# Patient Record
Sex: MALE
[De-identification: names, ages, dates, MRNs, and addresses within clinical notes are randomized per-mention and may not be internally consistent; named-entity substitution may affect disease eponyms.]

---

## 2017-03-19 ENCOUNTER — HOSPITAL ENCOUNTER (EMERGENCY)
Dept: HOSPITAL 25 - UCEAST | Age: 57
Discharge: HOME | End: 2017-03-19
Payer: COMMERCIAL

## 2017-03-19 VITALS — DIASTOLIC BLOOD PRESSURE: 85 MMHG | SYSTOLIC BLOOD PRESSURE: 154 MMHG

## 2017-03-19 DIAGNOSIS — R10.12: Primary | ICD-10-CM

## 2017-03-19 DIAGNOSIS — E11.9: ICD-10-CM

## 2017-03-19 PROCEDURE — G0463 HOSPITAL OUTPT CLINIC VISIT: HCPCS

## 2017-03-19 PROCEDURE — 93005 ELECTROCARDIOGRAM TRACING: CPT

## 2017-03-19 PROCEDURE — 85025 COMPLETE CBC W/AUTO DIFF WBC: CPT

## 2017-03-19 PROCEDURE — 36415 COLL VENOUS BLD VENIPUNCTURE: CPT

## 2017-03-19 PROCEDURE — 81003 URINALYSIS AUTO W/O SCOPE: CPT

## 2017-03-19 PROCEDURE — 99212 OFFICE O/P EST SF 10 MIN: CPT

## 2017-03-19 NOTE — UC
Abdominal Pain Male HPI





- HPI Summary


HPI Summary: 





57 yo male had the onset of left sided abd pain yesterday


felt gassy


could not get comfortable


tossed and turned last night


today pain is less


no n/v/d


some mild chest tightness


some mild URI symptoms





no hx diverticulitis





DM x 10 yrs











- History of Current Complaint


Chief Complaint: UCRespiratory


Stated Complaint: CHEST HEAVY SOB


Time Seen by Provider: 03/19/17 12:13


Hx Obtained From: Patient


Onset/Duration: Gradual Onset, Lasting Days


Timing: Constant


Severity Initially: Severe


Severity Currently: Mild


Pain Intensity: 4


Pain Scale Used: 0-10 Numeric


Location: Other - LUQ and LLQ


Radiates: Yes


Character: Cramping


Aggravating Factor(s):: Nothing


Alleviating Factor(s): Nothing


Associated Signs And Symptoms: Positive: Fever - jasmyn yesterdaY, Decreased 

Appetite.  Negative: Back Pain, Constipation, Blood in Stool, Urinary Symptoms, 

Vomiting, Diarrhea, Penile Discharge





- Allergies/Home Medications


Allergies/Adverse Reactions: 


 Allergies











Allergy/AdvReac Type Severity Reaction Status Date / Time


 


Amoxicillin Allergy Mild Nausea Verified 07/18/16 08:28











Home Medications: 


 Home Medications





Cyclobenzaprine TAB* [Flexeril 10 MG TAB*]  03/19/17 [History]


Glipizide [Glucotrol]  03/19/17 [History]


Liraglutide [Victoza]  03/19/17 [History]


Naproxen TAB* [Naprosyn TAB*]  03/19/17 [History]


metFORMIN* [Glucophage 1000 MG TAB *]  03/19/17 [History]











PMH/Surg Hx/FS Hx/Imm Hx


Endocrine History Of: Reports: Diabetes


   Denies: Thyroid Disease


Cardiovascular History Of: 


   Denies: Cardiac Disorders, Hypertension, Pacemaker/ICD


Respiratory History Of: 


   Denies: COPD, Asthma


GI/ History Of: 


   Denies: Ulcer, Renal Disease





- Surgical History


Surgical History: Yes


Surgery Procedure, Year, and Place: 2001 LEFT Knee surgery.  Tonsilectomy.  

Inguinal hernia





- Family History


Known Family History: Positive: Cardiac Disease - FATHER (NOT AT AN EARLY AGE)





- Social History


Alcohol Use: Occasionally


Substance Use Type: None


Smoking Status (MU): Former Smoker


Length of Time of Smoking/Using Tobacco: SMOKED 6 MONTHS IN 2006


Household Exposure Type: Cigarettes





Review of Systems


Constitutional: Negative


Skin: Negative


Eyes: Negative


ENT: Negative


Respiratory: Negative


Cardiovascular: Chest Pain - MILD PRESSURE


Gastrointestinal: Abdominal Pain


Genitourinary: Negative


Motor: Negative


Neurovascular: Negative


Musculoskeletal: Negative


Neurological: Negative


Psychological: Negative


All Other Systems Reviewed And Are Negative: Yes





Physical Exam


Triage Information Reviewed: Yes


Appearance: Well-Appearing, No Pain Distress, Other: - bmi  42


Vital Signs: 


 Initial Vital Signs











Temp  98.1 F   03/19/17 11:09


 


Pulse  93   03/19/17 11:09


 


Resp  20   03/19/17 11:09


 


BP  154/85   03/19/17 11:09


 


Pulse Ox  97   03/19/17 11:09











Eyes: Positive: Conjunctiva Clear


ENT: Positive: Hearing grossly normal, Other: - NO SINUS TENDERNESS.  Negative: 

Nasal congestion, Nasal drainage, Tonsillar swelling, Tonsillar exudate, Trismus

, Muffled/hoarse voice


Neck exam: Normal


Neck: Positive: Nontender, No Lymphadenopathy


Respiratory: Positive: Lungs clear, Normal breath sounds, No respiratory 

distress, No accessory muscle use


Cardiovascular: Positive: RRR, No Murmur.  Negative: Tachycardia, Bradycardia


Abdomen Description: Positive: Soft.  Negative: Nontender - LUQ AND LLQ TENDER, 

CVA Tenderness (R), CVA Tenderness (L), Distended, Peritoneal Signs, Pulsatile 

Mass


Bowel Sounds: Positive: Present


Musculoskeletal: Positive: ROM Intact, No Edema


Neurological: Positive: Alert


Psychological Exam: Normal


Skin Exam: Normal





Diagnostics





- EKG


Cardiac Rate: NL


Cardiac Rhythm: Sinus: Normal


Ectopy: None


ST Segment: Normal - LAHB





Abd Pain Male Course/Dx





- Differential Dx/Clinical Impression


Provider Diagnoses: LEFT SIDED ABDOMINAL PAIN -? DIVERTICULITIS.  LEFT ANTERIOR 

HEMIBLOCK





Discharge





- Discharge Plan


Condition: Stable


Disposition: HOME


Prescriptions: 


Metronidazole [Flagyl 500 MG TAB] 500 mg PO QID #28 tab


Sulfamethox/Trimethoprim DS* [Bactrim /160 TAB*] 1 tab PO BID #14 tab


Patient Education Materials:  Diverticulitis (ED)


Referrals: 


Richard Mack MD [Primary Care Provider] - 1 Day


Additional Instructions: 


SEE YOUR MD TOMORROW AS PLANNED





WE WILL START ANTIBIOTIC IN CASE YOU HAVE DIVERTICULITIS





IF THINGS WORSEN TODAY OR TONIGHT GO TO THE ER





YOUR EKG SHOWED A LEFT ANTERIOR HEMIBLOCK

## 2017-03-20 LAB
HCT VFR BLD AUTO: 47 % (ref 42–52)
HGB BLD-MCNC: 15.7 G/DL (ref 14–18)
MCH RBC QN AUTO: 28 PG (ref 27–31)
MCHC RBC AUTO-ENTMCNC: 34 G/DL (ref 31–36)
MCV RBC AUTO: 84 FL (ref 80–94)
RBC # BLD AUTO: 5.55 10^6/UL (ref 4–5.4)
WBC # BLD AUTO: 9.7 10^3/UL (ref 3.5–10.8)

## 2018-08-21 ENCOUNTER — HOSPITAL ENCOUNTER (EMERGENCY)
Dept: HOSPITAL 25 - UCEAST | Age: 58
Discharge: HOME | End: 2018-08-21
Payer: COMMERCIAL

## 2018-08-21 VITALS — DIASTOLIC BLOOD PRESSURE: 97 MMHG | SYSTOLIC BLOOD PRESSURE: 151 MMHG

## 2018-08-21 DIAGNOSIS — J32.9: Primary | ICD-10-CM

## 2018-08-21 DIAGNOSIS — Z87.898: ICD-10-CM

## 2018-08-21 DIAGNOSIS — Z88.3: ICD-10-CM

## 2018-08-21 DIAGNOSIS — R05: ICD-10-CM

## 2018-08-21 PROCEDURE — 71046 X-RAY EXAM CHEST 2 VIEWS: CPT

## 2018-08-21 PROCEDURE — G0463 HOSPITAL OUTPT CLINIC VISIT: HCPCS

## 2018-08-21 PROCEDURE — 99212 OFFICE O/P EST SF 10 MIN: CPT

## 2018-08-21 NOTE — UC
Respiratory Complaint HPI





- HPI Summary


HPI Summary: 





58 yo male presents with sinus pain/pressure/congestion, body aches, and 

productive cough for the last 3 days. He tells me that he was at a convention 

in [place] 1 week ago and yesterday found out that many people there were 

diagnosed with influenza A - he is concerned that he may have it and is 

requesting testing today. Has felt feverish, but has not taken his temperature. 

Has not taken anything OTC. Denies sore throat, SOB, chest pain, abdominal pain

, n/v.





- History of Current Complaint


Chief Complaint: UCRespiratory


Stated Complaint: COUGH, AND CHEST CONGESTION


Time Seen by Provider: 08/21/18 17:37


Hx Obtained From: Patient


Onset/Duration: Gradual Onset


Timing: Constant


Severity Initially: Moderate


Severity Currently: Moderate


Pain Intensity: 7


Pain Scale Used: 0-10 Numeric


Character: Cough: Productive





- Allergies/Home Medications


Allergies/Adverse Reactions: 


 Allergies











Allergy/AdvReac Type Severity Reaction Status Date / Time


 


amoxicillin Allergy  Nausea Verified 08/21/18 17:34











Home Medications: 


 Home Medications





Insulin NPH Human Isophane [Humulin N Kwikpen] 100 unit SC 08/21/18 [History]











PMH/Surg Hx/FS Hx/Imm Hx


Endocrine History: Diabetes





- Surgical History


Surgical History: Yes


Surgery Procedure, Year, and Place: 2001 LEFT Knee surgery.  Tonsilectomy.  

Inguinal hernia





- Family History


Known Family History: Positive: Cardiac Disease - FATHER (NOT AT AN EARLY AGE)





- Social History


Occupation: Employed Full-time


Lives: With Family


Alcohol Use: Occasionally


Substance Use Type: None


Smoking Status (MU): Former Smoker


Length of Time of Smoking/Using Tobacco: SMOKED 6 MONTHS IN 2006


Household Exposure Type: Cigarettes





Review of Systems


Constitutional: Fever, Other - Body aches


Skin: Negative


Eyes: Negative


ENT: Nasal Discharge, Sinus Congestion, Sinus Pain/Tenderness


Respiratory: Cough


Cardiovascular: Negative


Gastrointestinal: Negative


Neurovascular: Negative


Neurological: Negative


Psychological: Negative


All Other Systems Reviewed And Are Negative: Yes





Physical Exam





- Summary


Physical Exam Summary: 





GENERAL: NAD. WDWN. No pain distress.


SKIN: No rashes, sores, lesions, or open wounds.


HEENT:


            Head: AT/NC


            Eyes:  EOM intact. Conjunctiva clear without inflammation or 

discharge.


            Ears: Hearing grossly normal. TMs intact, no bulging, erythema, or 

edema. 


            Nose: Nasal mucosa mildly swollen and erythematous with yellow/

clear discharge. TTP maxillary and frontal sinus.


            Throat: Posterior oropharynx without exudates, erythema, or 

tonsillar enlargement.  Uvula midline.


NECK: Supple. Nontender. No lymphadenopathy. 


CHEST:  CTAB. No r/r/w. No accessory muscle use. Breathing comfortably and in 

no distress.


CV:  RRR. Without m/r/g. Pulses intact. 


NEURO: Alert. CN II-XII grossly intact.


PSYCH: Age appropriate behavior.


Triage Information Reviewed: Yes


Vital Signs: 


 Initial Vital Signs











Temp  101.0 F   08/21/18 17:31


 


Pulse  116   08/21/18 17:31


 


Resp  18   08/21/18 17:31


 


BP  151/97   08/21/18 17:31


 


Pulse Ox  98   08/21/18 17:31








 Laboratory Tests











  08/21/18





  17:47


 


Influenza A (Rapid)  Negative


 


Influenza B (Rapid)  Negative











Vital Signs Reviewed: Yes





UC Diagnostic Evaluation





- Laboratory


O2 Sat by Pulse Oximetry: 98





Respiratory Course/Dx





- Course


Course Of Treatment: POC flu negative. CXR has no radiologist reading after 

1800 therefore wet read is read as negative for acute process. Suspect 

sinusitis vs bronchitis. Will rx for zpak and have him f/u if his symptoms 

persist/worsen. Pt agreeable with plan.





- Differential Dx/Diagnosis


Provider Diagnoses: Sinusitis.  Cough





Discharge





- Sign-Out/Discharge


Documenting (check all that apply): Patient Departure


All imaging exams completed and their final reports reviewed: No





- Discharge Plan


Condition: Stable


Disposition: HOME


Prescriptions: 


Azithromycin TAB* [Zithromax TAB (Z-GRISELDA) 250 mg #6 tabs] 2 tab PO .TODAY, THEN 

1 DAILY #1 griselda


Patient Education Materials:  Sinusitis (ED), Acute Bronchitis (ED)


Forms:  *Work Release


Referrals: 


Richard Mack MD [Primary Care Provider] - 


Additional Instructions: 


If you develop a fever, shortness of breath, chest pain, new or worsening 

symptoms - please call your PCP or go to the ED.


 





Your blood pressure was high at todays visit. Please see your primary provider 

within 4 weeks for recheck and re-evaluation.








- Billing Disposition and Condition


Condition: STABLE


Disposition: Home

## 2018-08-22 NOTE — ED
Course/Dx





- Course


Course Of Treatment: POC flu negative. CXR has no radiologist reading after 

1800 therefore wet read is read as negative for acute process. Suspect 

sinusitis vs bronchitis. Will rx for zpak and have him f/u if his symptoms 

persist/worsen. Pt agreeable with plan.





Discharge





- Sign-Out/Discharge


Documenting (check all that apply): Patient Departure


All imaging exams completed and their final reports reviewed: Yes





- Discharge Plan


Condition: Stable


Disposition: HOME


Prescriptions: 


Azithromycin TAB* [Zithromax TAB (Z-GRISELDA) 250 mg #6 tabs] 2 tab PO .TODAY, THEN 

1 DAILY #1 griselda


Patient Education Materials:  Sinusitis (ED), Acute Bronchitis (ED)


Forms:  *Work Release


Referrals: 


Richard Mack MD [Primary Care Provider] - 


Additional Instructions: 


If you develop a fever, shortness of breath, chest pain, new or worsening 

symptoms - please call your PCP or go to the ED.


 





Your blood pressure was high at todays visit. Please see your primary provider 

within 4 weeks for recheck and re-evaluation.








- Billing Disposition and Condition


Condition: STABLE


Disposition: Home

## 2018-08-22 NOTE — RAD
HISTORY: cough



COMPARISONS: None



VIEWS: 4: Frontal dual-energy and lateral views of the chest.



FINDINGS:

CARDIOMEDIASTINAL SILHOUETTE: The cardiomediastinal silhouette is normal.

CARA: The cara are normal.

PLEURA: The costophrenic angles are sharp. No pleural abnormalities are noted.

LUNG PARENCHYMA: The lungs are clear.

ABDOMEN: The upper abdomen is clear. There is no subphrenic gas.

BONES AND SOFT TISSUES: No bone or soft tissue abnormalities are noted.

OTHER: None.



IMPRESSION:

NO ACTIVE CARDIOPULMONARY DISEASE.



R0

## 2019-10-02 ENCOUNTER — HOSPITAL ENCOUNTER (EMERGENCY)
Dept: HOSPITAL 25 - ED | Age: 59
Discharge: HOME | End: 2019-10-02
Payer: COMMERCIAL

## 2019-10-02 VITALS — DIASTOLIC BLOOD PRESSURE: 87 MMHG | SYSTOLIC BLOOD PRESSURE: 151 MMHG

## 2019-10-02 DIAGNOSIS — I73.9: ICD-10-CM

## 2019-10-02 DIAGNOSIS — M85.80: ICD-10-CM

## 2019-10-02 DIAGNOSIS — M19.90: ICD-10-CM

## 2019-10-02 DIAGNOSIS — Z88.1: ICD-10-CM

## 2019-10-02 DIAGNOSIS — Z79.4: ICD-10-CM

## 2019-10-02 DIAGNOSIS — M25.562: Primary | ICD-10-CM

## 2019-10-02 DIAGNOSIS — Z23: ICD-10-CM

## 2019-10-02 DIAGNOSIS — E11.9: ICD-10-CM

## 2019-10-02 DIAGNOSIS — Z87.891: ICD-10-CM

## 2019-10-02 PROCEDURE — 90715 TDAP VACCINE 7 YRS/> IM: CPT

## 2019-10-02 PROCEDURE — 99282 EMERGENCY DEPT VISIT SF MDM: CPT

## 2019-10-02 PROCEDURE — 90471 IMMUNIZATION ADMIN: CPT

## 2019-10-02 RX ADMIN — TETANUS TOXOID, REDUCED DIPHTHERIA TOXOID AND ACELLULAR PERTUSSIS VACCINE, ADSORBED ONE ML: 5; 2.5; 8; 8; 2.5 SUSPENSION INTRAMUSCULAR at 08:11

## 2019-10-02 NOTE — ED
Lower Extremity





- HPI Summary


HPI Summary: 





Pt is a 57 y/o M presenting to the ED for a chief complaint of left leg myalgia 

after a fall on 10/02/19. Pt fell while walking out of the door at his house 

and fell on his left leg. Pt was able to walk after the fall. Pt has injured 

his left leg in the past. The pain is mostly localized to the left knee. Pt 

feels as if the bone is punched out of place. Pt denies fever. Pt also 

fractured his left ankle in 2005. Pt has a PMHx of DM, a FMHx of cancer, and a 

PSHx of a laparoscopic surgery in September 2001 for a torn meniscus. 





- History of Current Complaint


Chief Complaint: EDExtremityLower


Stated Complaint: L KNEE PAIN PER PT


Time Seen by Provider: 10/02/19 07:23


Hx Obtained From: Patient


Mechanism Of Injury: Fall From A Standing Position


Onset of Pain: Immediate


Onset/Duration: Still Present


Severity Initially: Severe


Severity Currently: Severe


Pain Intensity: 7


Pain Scale Used: 0-10 Numeric


Timing: Constant, Lasting Hours


Location: Is Discrete @ - Left LE, mostly in the left knee


Character Of Pain: Unable To Describe - Feels as if "bone is punched out of 

place"


Associated Signs And Symptoms: Positive: Knee Pain.  Negative: Fever


Aggravating Factor(s): Nothing


Alleviating Factor(s): Nothing


Able to Bear Weight: Yes





- Allergies/Home Medications


Allergies/Adverse Reactions: 


 Allergies











Allergy/AdvReac Type Severity Reaction Status Date / Time


 


amoxicillin Allergy  Nausea Verified 10/02/19 08:13














PMH/Surg Hx/FS Hx/Imm Hx


Previously Healthy: Yes


Endocrine/Hematology History: Reports: Hx Diabetes - type 2 dm


   Denies: Hx Thyroid Disease


Cardiovascular History: 


   Denies: Hx Hypertension, Hx Pacemaker/ICD


Respiratory History: 


   Denies: Hx Asthma, Hx Chronic Obstructive Pulmonary Disease (COPD)


GI History: 


   Denies: Hx Ulcer


 History: 


   Denies: Hx Renal Disease


Sensory History: 


   Denies: Hx Hearing Aid


Psychiatric History: 


   Denies: Hx Panic Disorder





- Surgical History


Surgical History: Yes


Surgery Procedure, Year, and Place: 2001 LEFT Knee surgery.  Tonsilectomy.  

Inguinal hernia


Infectious Disease History: No


Infectious Disease History: 


   Denies: Hx Hepatitis, Hx Human Immunodeficiency Virus (HIV), History Other 

Infectious Disease, Traveled Outside the [place] in Last 30 Days





- Family History


Known Family History: Positive: Cardiac Disease - FATHER (NOT AT AN EARLY AGE)





- Social History


Alcohol Use: Occasionally


Hx Substance Use: No


Substance Use Type: Reports: None


Smoking Status (MU): Former Smoker


Length of Time of Smoking/Using Tobacco: SMOKED 6 MONTHS IN 2006





Review of Systems


Negative: Fever


Positive: Arthralgia - Left knee, Myalgia - Left LE, mostly in the left knee


All Other Systems Reviewed And Are Negative: Yes





Physical Exam





- Summary


Physical Exam Summary: 





Constitutional: Well-developed, Well-nourished, Alert. (-) Distressed


Skin: Warm, Dry. Abrasion to left knee


HENT: Normocephalic; Atraumatic


Eyes: Conjunctiva normal


Neck: Musculoskeletal ROM normal neck. (-) JVD, (-) Stridor, (-) Nuchal rigidity


Cardio: Rhythm regular, rate normal, Heart sounds normal; Intact distal pulses; 

Radial pulses are 2+ and symmetric. (-) Murmur


Pulmonary/Chest wall: Effort normal. (-) Respiratory distress, (-) Wheezes, (-) 

Rales


Abd: Soft, (-) tenderness, (-) Distension, (-) Guarding, (-) Rebound


Musculoskeletal: (-) Edema. Notable for tenderness over the patella of left 

knee and distal fibula. No ligamentous instability. 2+ DP pulse bilaterally, 

negative straight leg raise, full ROM of knee


Lymph: (-) Cervical adenopathy


Neuro: Alert, Oriented x3


Psych: Mood and affect Normal


Triage Information Reviewed: Yes


Vital Signs On Initial Exam: 


 Initial Vitals











Temp Pulse Resp BP Pulse Ox


 


 96.2 F   81   18   140/93   97 


 


 10/02/19 07:12  10/02/19 07:12  10/02/19 07:12  10/02/19 07:12  10/02/19 07:12











Vital Signs Reviewed: Yes





Procedures





- Sedation


Patient Received Moderate/Deep Sedation with Procedure: No





Diagnostics





- Vital Signs


 Vital Signs











  Temp Pulse Resp BP Pulse Ox


 


 10/02/19 07:12  96.2 F  81  18  140/93  97














- Laboratory


Lab Statement: Any lab studies that have been ordered have been reviewed, and 

results considered in the medical decision making process.





- Radiology


  ** Knee X-ray


Radiology Interpretation Completed By: Radiologist


Summary of Radiographic Findings: Knee X-ray IMPRESSION:  1.  OSTEOPENIA.  2.  

OSTEOARTHRITIS.  3.  PERIPHERAL ARTERIAL DISEASE.  4.  NO ACUTE OSSEOUS INJURY. 

IF SYMPTOMS PERSIST, RECOMMEND REPEAT IMAGING.  Reviewed by ED physician.





  ** Lower extremity X-ray


Radiology Interpretation Completed By: Radiologist


Summary of Radiographic Findings: Lower extremity X-ray IMPRESSION:  1.  

OSTEOPENIA.  2.  OSTEOARTHRITIS.  3.  PERIPHERAL ARTERIAL DISEASE.  4.  NO 

ACUTE OSSEOUS INJURY. IF SYMPTOMS PERSIST, RECOMMEND REPEAT IMAGING.  Reviewed 

by ED physician.





Re-Evaluation





- Re-Evaluation


  ** 1st re-eval


Re-Evaluation Time: 09:43


Change: Unchanged


Comment: At 09:43, I updated the pt about imaging results. Ambulated, given 

work excuse





Lower Extremity Course/Dx





- Course


Course Of Treatment: 58 year-old male presents with left knee pain after ground 

level fall.  Neurovascular intact, abrasion of the left knee, tenderness of the 

patella and distal fibula, check XRays





- Diagnoses


Provider Diagnoses: 


 Left knee pain








Discharge ED





- Sign-Out/Discharge


Documenting (check all that apply): Patient Departure - Discharge





- Discharge Plan


Condition: Stable


Disposition: HOME


Patient Education Materials:  Knee Pain (ED)


Forms:  *Work Release


Referrals: 


Richard Mack MD [Primary Care Provider] - 


Additional Instructions: 


You were seen in the emergency department for knee pain.  Your x-rays did not 

show any fractures.  Please follow up with orthopedic surgeon


If any studies were not completed at the time of discharge you will be called 

with the relevant results.


Please follow up with your primary care doctor in next 2-3 days and return to 

emergency department for worsening or concerning symptoms.


It was a pleasure taking care of you today.





- Billing Disposition and Condition


Condition: STABLE


Disposition: Home





- Attestation Statements


Document Initiated by Lanieibyamileth: Yes


Documenting Scribe: Joann Victor


Provider For Whom Sherwin is Documenting (Include Credential): Massiel Person MD.


Scribe Attestation: 


I, meena Casted for Massiel Person MD. on 10/02/19 at 1001. 


Scribe Documentation Reviewed: Yes


Provider Attestation: 


The documentation as recorded by the Joann lebron accurately reflects 

the service I personally performed and the decisions made by me, Massiel Person MD.


Status of Scribe Document: Viewed
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
## 2019-12-03 ENCOUNTER — HOSPITAL ENCOUNTER (OUTPATIENT)
Dept: HOSPITAL 25 - ED | Age: 59
Setting detail: OBSERVATION
LOS: 1 days | Discharge: HOME | End: 2019-12-04
Attending: INTERNAL MEDICINE | Admitting: INTERNAL MEDICINE
Payer: COMMERCIAL

## 2019-12-03 DIAGNOSIS — Z79.899: ICD-10-CM

## 2019-12-03 DIAGNOSIS — Z82.49: ICD-10-CM

## 2019-12-03 DIAGNOSIS — Z95.5: ICD-10-CM

## 2019-12-03 DIAGNOSIS — Z79.4: ICD-10-CM

## 2019-12-03 DIAGNOSIS — I25.10: ICD-10-CM

## 2019-12-03 DIAGNOSIS — Z87.891: ICD-10-CM

## 2019-12-03 DIAGNOSIS — E78.5: ICD-10-CM

## 2019-12-03 DIAGNOSIS — Z79.82: ICD-10-CM

## 2019-12-03 DIAGNOSIS — E11.9: ICD-10-CM

## 2019-12-03 DIAGNOSIS — R07.89: Primary | ICD-10-CM

## 2019-12-03 DIAGNOSIS — R94.31: ICD-10-CM

## 2019-12-03 LAB
ALBUMIN SERPL BCG-MCNC: 4.2 G/DL (ref 3.2–5.2)
ALBUMIN/GLOB SERPL: 1.4 {RATIO} (ref 1–3)
ALP SERPL-CCNC: 98 U/L (ref 34–104)
ALT SERPL W P-5'-P-CCNC: 20 U/L (ref 7–52)
ANION GAP SERPL CALC-SCNC: 5 MMOL/L (ref 2–11)
AST SERPL-CCNC: 15 U/L (ref 13–39)
BASOPHILS # BLD AUTO: 0.1 10^3/UL (ref 0–0.2)
BUN SERPL-MCNC: 18 MG/DL (ref 6–24)
BUN/CREAT SERPL: 24.7 (ref 8–20)
CALCIUM SERPL-MCNC: 9.8 MG/DL (ref 8.6–10.3)
CHLORIDE SERPL-SCNC: 103 MMOL/L (ref 101–111)
EOSINOPHIL # BLD AUTO: 0.2 10^3/UL (ref 0–0.6)
GLOBULIN SER CALC-MCNC: 2.9 G/DL (ref 2–4)
GLUCOSE SERPL-MCNC: 93 MG/DL (ref 70–100)
HCO3 SERPL-SCNC: 32 MMOL/L (ref 22–32)
HCT VFR BLD AUTO: 45 % (ref 42–52)
HGB BLD-MCNC: 15.2 G/DL (ref 14–18)
LYMPHOCYTES # BLD AUTO: 2.1 10^3/UL (ref 1–4.8)
MCH RBC QN AUTO: 29 PG (ref 27–31)
MCHC RBC AUTO-ENTMCNC: 34 G/DL (ref 31–36)
MCV RBC AUTO: 86 FL (ref 80–94)
MONOCYTES # BLD AUTO: 1.2 10^3/UL (ref 0–0.8)
NEUTROPHILS # BLD AUTO: 6.8 10^3/UL (ref 1.5–7.7)
NRBC # BLD AUTO: 0 10^3/UL
NRBC BLD QL AUTO: 0
PLATELET # BLD AUTO: 287 10^3/UL (ref 150–450)
POTASSIUM SERPL-SCNC: 4.1 MMOL/L (ref 3.5–5)
PROT SERPL-MCNC: 7.1 G/DL (ref 6.4–8.9)
RBC # BLD AUTO: 5.25 10^6 /UL (ref 4.18–5.48)
SODIUM SERPL-SCNC: 140 MMOL/L (ref 135–145)
TROPONIN I SERPL-MCNC: 0.01 NG/ML (ref ?–0.03)
TSH SERPL-ACNC: 1.6 MCIU/ML (ref 0.34–5.6)
WBC # BLD AUTO: 10.3 10^3/UL (ref 3.5–10.8)

## 2019-12-03 PROCEDURE — 84443 ASSAY THYROID STIM HORMONE: CPT

## 2019-12-03 PROCEDURE — 99283 EMERGENCY DEPT VISIT LOW MDM: CPT

## 2019-12-03 PROCEDURE — 80053 COMPREHEN METABOLIC PANEL: CPT

## 2019-12-03 PROCEDURE — 36415 COLL VENOUS BLD VENIPUNCTURE: CPT

## 2019-12-03 PROCEDURE — 71045 X-RAY EXAM CHEST 1 VIEW: CPT

## 2019-12-03 PROCEDURE — 93017 CV STRESS TEST TRACING ONLY: CPT

## 2019-12-03 PROCEDURE — 96372 THER/PROPH/DIAG INJ SC/IM: CPT

## 2019-12-03 PROCEDURE — 84484 ASSAY OF TROPONIN QUANT: CPT

## 2019-12-03 PROCEDURE — 80061 LIPID PANEL: CPT

## 2019-12-03 PROCEDURE — 83036 HEMOGLOBIN GLYCOSYLATED A1C: CPT

## 2019-12-03 PROCEDURE — G0378 HOSPITAL OBSERVATION PER HR: HCPCS

## 2019-12-03 PROCEDURE — 85025 COMPLETE CBC W/AUTO DIFF WBC: CPT

## 2019-12-03 PROCEDURE — A9502 TC99M TETROFOSMIN: HCPCS

## 2019-12-03 PROCEDURE — 78452 HT MUSCLE IMAGE SPECT MULT: CPT

## 2019-12-03 PROCEDURE — 93005 ELECTROCARDIOGRAM TRACING: CPT

## 2019-12-03 RX ADMIN — HEPARIN SODIUM SCH UNITS: 5000 INJECTION INTRAVENOUS; SUBCUTANEOUS at 21:01

## 2019-12-03 RX ADMIN — HEPARIN SODIUM SCH UNITS: 5000 INJECTION INTRAVENOUS; SUBCUTANEOUS at 14:58

## 2019-12-03 RX ADMIN — METFORMIN HYDROCHLORIDE SCH MG: 500 TABLET, FILM COATED ORAL at 21:01

## 2019-12-03 RX ADMIN — METOPROLOL TARTRATE SCH MG: 25 TABLET, FILM COATED ORAL at 21:01

## 2019-12-03 NOTE — HP
CC:  Dr. Mack; Dr. Theodore *

 

ADMISSION HISTORY AND PHYSICAL:

 

DATE OF ADMISSION:  12/03/19

 

PRIMARY CARE PROVIDER:  Dr. Mack.

 

ENDOCRINOLOGIST:  Dr. Theodore.

 

CARDIOLOGIST:  Dr. Florina Mir with [facility] in [place].

 

HEALTHCARE PROXY:  His daughter.

 

CODE STATUS:  DNR and DNI, discussed with the patient, filled out MOLST.

 

SOURCE OF INFORMATION:  History obtained from interview with the patient, 
discussion with ER physician.

 

RELIABILITY:  Very good.

 

CHIEF COMPLAINT:  Chest discomfort.

 

HISTORY OF PRESENT ILLNESS:  This is a 58-year-old man with past medical 
history of CAD, status post PCI in 2017 at [facility], who reports 
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 
tired today, went to his boss, requested a day off, which was declined.  He 
reports he got upset, started feeling his heart racing with "a little discomfort
" as well as pain in bilateral shoulders.  He noticed the pain in his bilateral 
shoulders has been intermittent over the last several days, but was worse with 
"racing heart."  He had shortness of breath and felt "a little sweaty" that 
lasted several minutes without radiation, but was associated with 
lightheadedness.  He did walk about 60 feet before sitting down and putting his 
head in his hands and did not note any worsening of his discomfort or 
palpitations.  He did have pain and shortness of breath 2 years prior to 
presentation with his PCI at [facility], but he noted it felt different 
because last time he had shortness of breath with all of his activities and not 
intermittently as presenting at this time.  He notes last night he was 
shoveling snow without chest pain or discomfort or shortness of breath.  
Currently, he feels tired and hungry, but is chest pain-free.  He suspects that 
he had 1 fever last week because he had had dry lips.  He has had no cough, no 
nausea, vomiting, diarrhea.  He has had no changes to his medications or travel.

 

PAST MEDICAL HISTORY:  Includes insulin dependent diabetes mellitus; CAD with 
PCI in 2017, details will be requested, at [facility]; hyperlipidemia.

 

PAST SURGICAL HISTORY:  He had left knee meniscus repair 1-1/2 months prior.  
He had a hernia repair and tonsillectomy.

 

MEDICATIONS:  Reviewed with the patient, include:

1.  Cyclobenzaprine 10 mg 2 to 3 times per day p.r.n.

2.  Atorvastatin 80 mg daily.

3.  Cinnamon bark 1000 mg daily.

4.  Aspirin 81 mg daily.

5.  Plavix 75 mg daily.

6.  Metoprolol tartrate 25 mg twice daily.

7.  Nitroglycerin sublingual.

8.  Ascorbic acid 500 mg in the evening.

9.  Korean Ginseng 350 mg daily.

10.  Ascorbic acid 1000 mg daily.

11.  Grape seed with bioflavin and citrus 100 mg twice daily.

12.  Turmeric 400 mg daily.

13.  Co-enzyme Q10 1 cap daily.

14.  Jardiance 10 mg in the morning.

15.  Actos 15 mg daily.

16.  Metformin 1500 mg in the evening.

17.  Novolin 70/30, 50 units in the morning and 60 units at night.

18.  Calcium, magnesium and zinc tab 1 tab in the evening and calcium, magnesium
, zinc tab 2 each in the morning.

 

ALLERGIES:  To AMOXICILLIN, 3 years prior caused upset stomach.

 

FAMILY HISTORY:  Significant for father with CAD, MI at age 78.  Mother with 
lung cancer.

 

SOCIAL HISTORY:  No tobacco.  Drinks 4 to 5 alcoholic beverages per month.  No 
illicits.  Drives [employer], he is a .

 

REVIEW OF SYSTEMS:  As per HPI, otherwise all other systems negative.

 

                               PHYSICAL EXAMINATION

 

GENERAL:  Sitting up in bed, interactive, pleasant, in no apparent distress.

 

VITAL SIGNS:  When seen by this author, blood pressure 142/87, heart rate 71, 
respiratory rate is 16, 97% on room air, T-max in the emergency room 97.9.

 

HEENT:  His oropharynx is clear.  He does have several missing teeth.  His 
mucous membranes are moist.

 

NECK:  He has non-elevated JVD.  No cervical or supraclavicular lymphadenopathy.

 

LUNGS:  Clear to auscultation bilaterally.

 

HEART:  He has regular rate and rhythm.  No murmurs, rubs, or gallops.

 

ABDOMEN:  Obese, soft, nontender, nondistended.

 

EXTREMITIES:  Warm and well perfused without clubbing, cyanosis, or edema.  He 
has less than 2-second cap refill.

 

NEURO:  Alert and oriented x3.  His cranial nerves II through XII are intact.

 

PSYCHIATRIC:  He has no apparent anxiety, agitation, or depression.

 

 DIAGNOSTIC STUDIES/LAB DATA:  Labs are reviewed notable for TSH 1.6, troponin 
I of 0.01, white blood cell count of 10.3, hemoglobin 15.2, and platelets 287.

 

Pertinent imaging:  Chest x-ray:  Low lung volumes.  No active cardiopulmonary 
disease.  EKG:  Normal sinus rhythm, left axis, late R-wave progression, normal 
limit intervals.  T wave flattening in aVF.  No Q's.  No ST depressions or 
elevations.

 

ASSESSMENT AND PLAN:  This is a 58-year-old man with past medical history of 
coronary artery disease presenting with chest discomfort and palpitation.

 

1.  Chest pain.  Consider for ischemic etiology, although first troponin is 
negative and EKG is non-ischemic.  His history of presenting with stress 
associated with chest discomfort, preceded by several days of bilateral 
shoulder pain, which may be anginal equivalent, is concerning.  I will trend 2 
additional troponins.  If any elevation in troponin, repeat EKG.  Repeat lipids 
in the morning, check hemoglobin A1c and plan on chemical stress with nuclear 
imaging.  The patient did fail his exercise component of his previous stress 
test 2 years prior.  Continue aspirin, Plavix and metoprolol, although n.p.o. 
after midnight.

2.  Insulin dependent type 2 diabetes mellitus.  Continue 70/30, although 
decrease morning dose from 50 to 35 in the setting of n.p.o. status.  
Fingersticks a.c., h.s.

3.  Hyperlipidemia.  Continue atorvastatin.

4.  DVT prophylaxis:  Heparin subcu.

 

 

 

[id number]/[facility] #: [id number]

MTDD

## 2019-12-03 NOTE — ED
HPI Chest Pain





- HPI Summary


HPI Summary: 





Patient is a 59 y/o M presenting to the ED for a chief complaint of diffuse 

chest pain. Patient notes the pain radiates to the bilateral shoulders and 

lasts for 5 minutes before resolving. He rates the chest pain as 6/10 in 

severity, with the pain now resolved. His last episode of chest pain was around 

09:00 on 12/03/19. 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 

of a mail truck He also reports palpitations and SOB on exertion. He notes 

recent stress. Patient denies any aggravating or alleviating factors. PSHx is 

significant for stent placement in August 2017 performed at [facility]. At that time, he had an abnormal EKG and was able to complete a 

cardiac stress test due to knee problems. PMHx is also significant for insulin-

dependent DM. FMHx is significant for MI in his father. Patient takes Plavix 

and aspirin daily. He denies tobacco use. He admits a visit to [facility] in the 

past. Patient sees Dr. Florina Mir, a cardiologist, in [place], [place]. 








- History of Current Complaint


Chief Complaint: EDChestPainROMI


Time Seen by Provider: 12/03/19 10:50


Hx Obtained From: Patient


Onset/Duration: Atraumatic, Resolved


Timing: Intermittent, Lasting Minutes - 5 minutes


Initial Severity: Moderate


Current Severity: Moderate


Pain Intensity: 6


Pain Scale Used: 0-10 Numeric


Chest Pain Location: Diffuse


Chest Pain Radiates: Yes


Chest Pain Radiates To:: Shoulder - Bilateral


Aggravating Factor(s): Nothing


Alleviating Factor(s): Nothing


Associated Signs and Symptoms: Positive: Chest Pain, Recent Stress, Shortness 

of Breath - On exertion, Palpitations





- Allergy/Home Medications


Allergies/Adverse Reactions: 


 Allergies











Allergy/AdvReac Type Severity Reaction Status Date / Time


 


amoxicillin Allergy  Nausea Verified 12/03/19 10:47











Home Medications: 


 Home Medications





Ascorbic Acid TAB* [Vitamin C  TAB*] 1,000 mg PO QAM 12/03/19 [History 

Confirmed 12/03/19]


Ascorbic Acid TAB* [Vitamin C  TAB*] 500 mg PO QPM 12/03/19 [History Confirmed 

12/03/19]


Aspirin EC TAB* [Ecotrin EC Low Dose 81 MG*] 81 mg PO DAILY 12/03/19 [History 

Confirmed 12/03/19]


Atorvastatin* [Lipitor*] 80 mg PO DAILY 12/03/19 [History Confirmed 12/03/19]


Calcium Carb/Mag Ox/Zinc Sulf [Calcium & Magnesium + Zin 334-134-5 mg] 1 tab PO 

QPM 12/03/19 [History Confirmed 12/03/19]


Calcium Carb/Mag Ox/Zinc Sulf [Calcium-Magnesium-Zinc Tablet] 2 each PO QAM 12/ 03/19 [History Confirmed 12/03/19]


Cinnamon Bark [Cinnamon] 1,000 mg PO DAILY 12/03/19 [History Confirmed 12/03/19]


Clopidogrel TAB* [Plavix TAB*] 75 mg PO DAILY 12/03/19 [History Confirmed 12/03/ 19]


Coenzyme Q10 (NF) [Th Co Q-10] 1 cap PO DAILY 12/03/19 [History Confirmed 12/03/ 19]


Cyclobenzaprine TAB* [Flexeril 10 MG TAB*] 10 mg PO .2-3X/DAY PRN 12/03/19 [

History Confirmed 12/03/19]


Empaglifozin (NF) [Jardiance (Nf)] 10 mg PO QAM 12/03/19 [History Confirmed 12/ 03/19]


Grape Seed Xt/Bioflavon,Citrus [Grape Seed 50 mg Capsule] 100 mg PO BID 12/03/ 19 [History Confirmed 12/03/19]


Insulin NPH Hum/Reg Insulin Hm [Novolin 70-30 Flexpen] 50 unit SQ QAM 12/03/19 [

History Confirmed 12/03/19]


Insulin NPH Hum/Reg Insulin Hm [Novolin 70-30 Flexpen] 60 unit SQ QPM 12/03/19 [

History Confirmed 12/03/19]


Metoprolol Tartrate TAB* [Lopressor TAB*] 25 mg PO BID 12/03/19 [History 

Confirmed 12/03/19]


Nitroglycerin TAB 0.4 MG* 0.4 mg SL Q5M PRN MDD 3 doses 12/03/19 [History 

Confirmed 12/03/19]


Panax Ginseng Root [Korean Ginseng] 350 mg PO DAILY 12/03/19 [History Confirmed 

12/03/19]


Pioglitazone TAB* [Actos TAB*] 15 mg PO DAILY 12/03/19 [History Confirmed 12/03/ 19]


Turmeric 400 mg PO DAILY 12/03/19 [History Confirmed 12/03/19]











PMH/Surg Hx/FS Hx/Imm Hx


Previously Healthy: Yes


Endocrine/Hematology History: Reports: Hx Diabetes - type 2 dm


   Denies: Hx Thyroid Disease


Cardiovascular History: 


   Denies: Hx Hypertension, Hx Pacemaker/ICD


Respiratory History: 


   Denies: Hx Asthma, Hx Chronic Obstructive Pulmonary Disease (COPD)


GI History: 


   Denies: Hx Ulcer


 History: 


   Denies: Hx Renal Disease


Sensory History: 


   Denies: Hx Legally Blind, Hx Deafness, Hx Hearing Aid


Opthamlomology History: 


   Denies: Hx Legally Blind


EENT History: 


   Denies: Hx Deafness


Psychiatric History: 


   Denies: Hx Panic Disorder





- Surgical History


Surgical History: Yes


Surgery Procedure, Year, and Place: 2001 LEFT Knee surgery.  Tonsilectomy.  

Inguinal hernia


Infectious Disease History: No


Infectious Disease History: 


   Denies: Hx Hepatitis, Hx Human Immunodeficiency Virus (HIV), History Other 

Infectious Disease, Traveled Outside the [place] in Last 30 Days





- Family History


Known Family History: Positive: Cardiac Disease - FATHER (NOT AT AN EARLY AGE), 

Other - MI





- Social History


Occupation: Employed Full-time


Lives: With Family


Alcohol Use: Occasionally


Hx Substance Use: No


Substance Use Type: Reports: None


Hx Tobacco Use: Yes


Smoking Status (MU): Former Smoker


Length of Time of Smoking/Using Tobacco: SMOKED 6 MONTHS IN 2006





Review of Systems


Positive: Palpitations, Chest Pain - Diffuse


Positive: Shortness Of Breath - On exertion


Positive: Arthralgia - Bilateral shoulders that radiates from the chest


All Other Systems Reviewed And Are Negative: Yes





Physical Exam





- Summary


Physical Exam Summary: 





Constitutional: Well-developed, Obese, Alert. (-) Distressed


Skin: Warm, Dry


HENT: Normocephalic; Atraumatic


Eyes: Conjunctiva normal


Neck: Musculoskeletal ROM normal neck. (-) JVD, (-) Stridor, (-) Nuchal rigidity


Cardio: Rhythm regular, rate normal, Heart sounds normal; Intact distal pulses; 

Radial pulses are 2+ and symmetric. (-) Murmur


Pulmonary/Chest wall: Effort normal. (-) Respiratory distress, (-) Wheezes, (-) 

Rales


Abd: Soft, (-) tenderness, (-) Distension, (-) Guarding, (-) Rebound


Musculoskeletal: (-) Edema


Lymph: (-) Cervical adenopathy


Neuro: Alert, Oriented x3


Psych: Mood and affect Normal


Triage Information Reviewed: Yes


Vital Signs On Initial Exam: 


 Initial Vitals











Temp Pulse Resp BP Pulse Ox


 


 97.9 F   67   18   142/77   99 


 


 12/03/19 10:45  12/03/19 10:45  12/03/19 10:45  12/03/19 10:45  12/03/19 10:45











Vital Signs Reviewed: Yes





Procedures





- Sedation


Patient Received Moderate/Deep Sedation with Procedure: No





Diagnostics





- Vital Signs


 Vital Signs











  Temp Pulse Resp BP Pulse Ox


 


 12/03/19 10:45  97.9 F  67  18  142/77  99














- Laboratory


Result Diagrams: 


 12/03/19 11:06





 12/03/19 11:06


Lab Statement: Any lab studies that have been ordered have been reviewed, and 

results considered in the medical decision making process.





- Radiology


  ** Chest X-ray


Radiology Interpretation Completed By: Radiologist


Summary of Radiographic Findings: Chest X-ray IMPRESSION: LOW LUNG VOLUMES. NO 

ACTIVE CARDIOPULMONARY DISEASE.  Reviewed by Dr. Person.





- EKG


  ** 10:39


Cardiac Rate: NL - 62 BPM


EKG Rhythm: Sinus Rhythm


ST Segment: Normal


Ectopy: None


EKG Comparison: No Significant Change - From 03/19/17


Summary of EKG Findings: An EKG at 10:39 reveals normal sinus rhythm 62 BPM, 

nml axis, nml intervals. No STEMI. No acute changes. T wave inversion in lead 

III, T wave flattening in aVF. No changes from prior on 03/19/17.  Reviewed and 

interpreted by Dr. Person.





Re-Evaluation





- Re-Evaluation


  ** First Eval


Re-Evaluation Time: 12:10


Change: Unchanged


Comment: At 12:10, trop neg x1. D/w patient about obs, tele, serial trops and 

likely cardiology consult patient is agreeable [place] [facility]





Chest Pain Course/Dx





- Course


Course Of Treatment: 59 y/o male w hx CAD s/p 2 stents 2 years ago, HLD, DM, p/

w CP while lifting heavy object at work now resolved.  - EKG w LAFB chronic, 

follows w cardiology at [place].  - plan for serial trop, tele, likely TBA 

given risk factors.  Chest Pain DDX:  The patient is well appearing, with 

stable vitals.  Given the patient's clinical presentation, highest on 

differential is angina.  Although less likely, differential also includes the 

following:  --Pneumothorax: Equal breath sounds, story inconsistent since 

gradual onset of symptoms. CXR shows no evidence of pneumothorax. Unlikely.  --

Cardiac tamponade: The history and physical are not concerning for tamponade. 

No Pulsus Paradoxus, no tachypnea. Unlikely.  --Mediastinitis or esophageal 

rupture: The history is not consistent, as the patient has had no recent 

history of significant wretching, instrumentation, or mediastinal surgeries. 

Unlikely.  --Aortic dissection: The patient does not describe the classical 

tearing chest pain radiating into the back, and the CXR does not show 

mediastinal widening or other signs of aortic dissection. Unlikely.  --PE: 

Vitals wnl (not hypoxic, tachycardic or tachypneic).  Heart score: 5





- Diagnoses


Provider Diagnoses: 


 Chest pain








- Provider Notifications


Discussed Care Of Patient With: Adebayo Marie - At 12:13, Dr. Adebayo Marie 

reviewed the patients case and agrees to [place] the [place] [facility] [place] 

diagnosis of [place] pain. 


Time Discussed With Above Provider: 12:13


Instructed by Provider To: Admit As Inpatient





Discharge ED





- Sign-Out/Discharge


Documenting (check all that apply): Patient Departure - Admit





- Discharge Plan


Condition: Stable


Disposition: ADMITTED TO [facility]


Referrals: 


Richard Mack MD [Primary Care Provider] - 





- Billing Disposition and Condition


Condition: STABLE


Disposition: Admitted to [facility]





- Attestation Statements


Document Initiated by Sherwin: Yes


Documenting Scribe: Joann Victor


Provider For Whom Sherwin is Documenting (Include Credential): Massiel Person MD


Scribe Attestation: 


I, Joann Victor, scribed for Massiel Person MD on 12/03/19 at 1310. 


Scribe Documentation Reviewed: Yes


Provider Attestation: 


The documentation as recorded by the Joann lebron accurately reflects 

the service I personally performed and the decisions made by me, Massiel Person MD


Status of Scribe Document: Viewed

## 2019-12-03 NOTE — XMS REPORT
Continuity of Care Document (CCD)

 Created on:2019



Patient:Jonatan Thrasher JR

Sex:Male

:1960

External Reference #:[id number]





Demographics







 Address  [address]



   [place] [address]

 

 Mobile Phone  [phone]

 

 Email Address  [email or web address]

 

 Preferred Language  en

 

 Marital Status  Not  or 

 

 Church Affiliation  Unknown

 

 Race  White

 

 Ethnic Group  Not  or 









Author







 Name  Boston Theodore MD (transmitted by agent of provider Rita Carnes)

 

 Address  [address]



   [place] [address]









Care Team Providers







 Name  Role  Phone

 

 Richard Mack MD - Family Medicine  Care Team Information   [phone]
[phone]









Problems







 Description

 

 No Information Available







Social History







 Type  Date  Description  Comments

 

 Birth Sex    Unknown  

 

 ETOH Use    Currently consumes alcohol  

 

 Tobacco Use  Start: Unknown  Patient has never smoked  

 

 Recreational Drug Use    Denies Drug Use  

 

 Smoking Status  Reviewed: 10/30/19  Patient has never smoked  

 

 Exercise Type/Frequency    Exercises sporadically  







Allergies, Adverse Reactions, Alerts







 Active Allergies  Reaction  Severity  Comments  Date

 

 Amoxicillin      nausea  2018







Medications







 Active Medications  SIG  Qnty  Indications  Ordering  Date



         Provider  

 

 Calcium-Magnesium-Zinc  2 am and 1 in      Boston Theodore MD  2019



   pm.        

 

 Novolin 70/30  50 units am, 60  90ml  E11.65  Boston Theodore MD  2019



   units pm with        



 (70-30)100Unit/ML  meals or as        



 Suspension  directed, mdd        



   120        

 

 Metformin HCL ER  2 tablets by  180tabs    Boston Theodore MD  2019



              750mg  mouth every day        



 Tablets ER 24HR  at bedtime        



           

 

 Pioglitazone HCL  take 15mg once  90tabs  E11.65  Boston Theodore MD  2019



              15mg  daily        



 Tablets          



           

 

 Jardiance  10mg by mouth  90tabs    Boston Theodore MD  2019



       10mg Tablets  daily in the        



   morning        

 

 Co-Enzyme Q-10  1 by mouth once      Unknown  



            100mg  a day        



 Capsules          



           

 

 Turmeric  1 daily      Unknown  



      400mg Capsules          



           

 

 Ra Grape Seed  1 am and 1 pm      Unknown  



           100mg          



 Capsules          



           

 

 German Ginseng  1 daily      Unknown  



            350mg          



 Capsules          



           

 

 Vitamin C Plus  2 by mouth in      Unknown  



            500mg  the am, 1 in        



 Tablets  the pm        



           

 

 Nitrostat  one sl q5min up      Unknown  



       0.4mg Tablets Sub  to 3 doses as        



   needed        

 

 Metoprolol Tartrate  1 by mouth      Unknown  



                 25mg  twice a day        



 Tablets          



           

 

 Plavix  1 by mouth      Unknown  



    75mg Tablets  every day        



           

 

 Insulin  4 times daily      Unknown  



 Syringe/0.5ML/30G X  for insulin        



 1/2"          



  30G X 1/2" 0.5 ML Misc          



           

 

 GNP Cinnamon  2 by mouth      Unknown  



          500mg Capsules  every day        



           

 

 Aspirin 81 Low Dose  1 by mouth      Unknown  



                 81mg  every day        



 Chewtabs          



           

 

 Cyclobenzaprine HCL  take 1 tab by      Unknown  



                 10mg  mouth 2-3 times        



 Tablets  a day as needed        



           

 

 Atorvastatin Calcium  1 tablet daily      Unknown  



                  80mg          



 Tablets          



           







Medications Administered in Office







 Medication  SIG  Qnty  Indications  Ordering Provider  Date

 

 Depomedrol 40MG        Marty Riley MD  10/03/2019



       Injection          



           

 

 Depomedrol 40MG        Marty Riley MD  2019



       Injection          



           

 

 Depomedrol 40MG        Marty Riley MD  2019



       Injection          



           

 

 Depomedrol 40MG        Marty Riley MD  2019



       Injection          



           

 

 Depomedrol 40MG        Marty Riley MD  2019



       Injection          



           

 

 Depomedrol 40MG        Marty Riley MD  2019



       Injection          



           

 

 Depomedrol 40MG        Marty Riley MD  2019



       Injection          



           

 

 Synvisc Or Synvisc-One        Haris Rosano, JOELLEN  2018



 Injection 1 MG          



      Injection          



           

 

 Synvisc Or Synvisc-One        Haris Rosano, [facility]  2018



 Injection 1 MG          



      Injection          



           

 

 Synvisc Or Synvisc-One        Haris Rosano, PA  2018



 Injection 1 MG          



      Injection          



           

 

 Synvisc Or Synvisc-One        Haris Rosano, PA  2018



 Injection 1 MG          



      Injection          



           

 

 Synvisc Or Synvisc-One        Fouzia Bitting, [facility]  09/10/2018



 Injection 1 MG          



      Injection          



           

 

 Synvisc Or Synvisc-One        Fouzia Bitting, [facility]  09/10/2018



 Injection 1 MG          



      Injection          



           

 

 Depomedrol 40MG        Haris Godfrey PA-C  2018



       Injection          



           

 

 Depomedrol 40MG        Haris Godfrey PA-C  2018



       Injection          



           

 

 Depomedrol 40MG        Marty Riley MD  2018



       Injection          



           







Immunizations







 Description

 

 No Information Available







Vital Signs







 Date  Vital  Result  Comment

 

 10/30/2019  3:25pm  Height  68 inches  5'8"









 Weight  268.00 lb  w/ shoes

 

 Heart Rate  67 /min  

 

 BP Systolic Sitting  160 mmHg  

 

 BP Diastolic Sitting  88 mmHg  

 

 BMI (Body Mass Index)  40.7 kg/m2  









 10/03/2019  8:06am  Height  68 inches  5'8"









 Weight  270.00 lb  

 

 Heart Rate  61 /min  

 

 BP Systolic  130 mmHg  

 

 BP Diastolic  82 mmHg  

 

 Respiratory Rate  18 /min  

 

 Body Temperature  98.1 F  

 

 Pain Level  2  

 

 BMI (Body Mass Index)  41.0 kg/m2  







Results







 Test  Acquired Date  Facility  Test  Result  H/L  Range  Note

 

 Urine  2019  [facility]  Ur Microalbumin  < 15.0      



 Microalbumin    [address]  (mg/L)  mg/L      



 [place] [address] [phone]          









 Urine Creatinine  83.95 mg/dL      

 

 Urine Microalbumin/Creatinine  TNP    <31  1









 Comp Metabolic  2019  [facility]  Sodium  135 mmol/L  Normal  
135-145  



 Panel    [address]          



     [place] [address] [phone]          









 Potassium  5.2 mmol/L  High  3.5-5.0  

 

 Chloride  96 mmol/L  Low  101-111  

 

 Co2 Carbon Dioxide  34 mmol/L  High  22-32  

 

 Anion Gap  5 mmol/L  Normal  2-11  

 

 Glucose  410 mg/dL  High    

 

 Blood Urea Nitrogen  14 mg/dL  Normal  6-24  

 

 Creatinine  0.69 mg/dL  Normal  0.67-1.17  

 

 BUN/Creatinine Ratio  20.3  High  8-20  

 

 Calcium  10.7 mg/dL  High  8.6-10.3  

 

 Total Protein  7.3 g/dL  Normal  6.4-8.9  

 

 Albumin  4.3 g/dL  Normal  3.2-5.2  

 

 Globulin  3.0 g/dL  Normal  2-4  

 

 Albumin/Globulin Ratio  1.4  Normal  1-3  

 

 Total Bilirubin  0.80 mg/dL  Normal  0.2-1.0  

 

 Alkaline Phosphatase  102 U/L  Normal    

 

 Alt  49 U/L  Normal  7-52  

 

 Ast  24 U/L  Normal  13-39  

 

 Egfr Non-  117.8    >60  

 

 Egfr   142.5    >60  2









 Lipid Profile  2019  [facility]  Triglycerides  146 mg/dL    
  3



 (Trig/Chol/HDL)    [address]          



     [place] [address] [phone]          









 Cholesterol  130 mg/dL      4

 

 HDL Cholesterol  40.7 mg/dL      5

 

 LDL Cholesterol  60 mg/dL      6









 1  Unable to calculate due to low microalbumin

 

 2  *******Because ethnic data is not always readily available,



   this report includes an eGFR for both -Americans and



   non- Americans.****



   The National Kidney Disease Education Program (NKDEP) does



   not endorse the use of the MDRD equation for patients that



   are not between the ages of 18 and 70, are pregnant, have



   extremes of body size, muscle mass, or nutritional status,



   or are non- or non-.



   According to the National Kidney Foundation, irrespective of



   diagnosis, the stage of the disease is based on the level of



   kidney function:



   Stage Description                      GFR(mL/min/1.73 m(2))



   1     Kidney damage with normal or decreased GFR       90



   2     Kidney damage with mild decrease in GFR          60-89



   3     Moderate decrease in GFR                         30-59



   4     Severe decrease in GFR                           15-29



   5     Kidney failure                       <15 (or dialysis)

 

 3  Desirable: <150



   Borderline High: 150-199



   High: 200-499



   Very High: >500

 

 4  Desirable: <200



   Borderline High: 200-239



   High: >239

 

 5  Low: <40



   Desirable: 40-60



   High: >60

 

 6  Desirable: <100



   Near Optimal: 100-129



   Borderline High: 130-159



   High: 160-189



   Very High: >189







Procedures







 Date  Code  Description  Status

 

 10/03/2019  85072  Inject/Drain Joint/Bursa Major W/O US  Completed

 

 201910  Inject/Drain Joint/Bursa Major W/O US  Completed

 

 2019  Inject/Drain Joint/Bursa Major W/O US  Completed







Medical Devices







 Description

 

 No Information Available







Encounters







 Type  Date  Location  Provider  Dx  Diagnosis

 

 Office Visit  10/03/2019  [facility]  Marty AMADOR  S80.02xA  Contusion 
of



   8:00a  at Jose Riley MD    left knee,



           initial



           encounter









 M17.12  Unilateral primary osteoarthritis, left knee

 

 W19.xxxA  Unspecified fall, initial encounter









 Office Visit  2019  [facility]  Boston Theodore,  E11.65  Type 2 
diabetes



   4:20p  Endocrinology of  MD    mellitus with



     Cma      hyperglycemia









 Z79.4  Long term (current) use of insulin

 

 E78.5  Hyperlipidemia, unspecified









 Office Visit  2019  [facility]  Boston Theodore,  E11.65  Type 2 
diabetes



   8:00a  Endocrinology of  MD    mellitus with



     Cma      hyperglycemia









 Z79.4  Long term (current) use of insulin

 

 I10  Essential (primary) hypertension

 

 E78.5  Hyperlipidemia, unspecified







Assessments







 Date  Code  Description  Provider

 

 10/30/2019  E11.65  Type 2 diabetes mellitus with  Boston Theodore MD



     hyperglycemia  

 

 10/30/2019  Z79.4  Long term (current) use of insulin  Boston Theodore MD

 

 10/30/2019  E83.52  Hypercalcemia  Boston Theodore MD

 

 10/03/2019  S80.02xA  Contusion of left knee, initial encounter  Marty Riley MD

 

 10/03/2019  M17.12  Unilateral primary osteoarthritis, left  Marty Riley MD



     knee  

 

 10/03/2019  W19.xxxA  Unspecified fall, initial encounter  Marty Riley MD

 

 2019  E11.65  Type 2 diabetes mellitus with  Boston Theodore MD



     hyperglycemia  

 

 2019  Z79.4  Long term (current) use of insulin  Boston Theodore MD

 

 2019  E78.5  Hyperlipidemia, unspecified  Boston Theodore MD

 

 2019  E11.65  Type 2 diabetes mellitus with  Boston Theodore MD



     hyperglycemia  

 

 2019  Z79.4  Long term (current) use of insulin  Boston Theodore MD

 

 2019  I10  Essential (primary) hypertension  Boston Theodore MD

 

 2019  E78.5  Hyperlipidemia, unspecified  Boston Theodore MD

 

 2019  M75.52  Bursitis of left shoulder  Marty Riley MD

 

 2019  M75.51  Bursitis of right shoulder  Marty Riley MD

 

 2019  M75.42  Impingement syndrome of left shoulder  Marty Riley MD

 

 2019  M75.41  Impingement syndrome of right shoulder  Marty Riley MD

 

 2019  M17.0  Bilateral primary osteoarthritis of knee  Marty Riley MD







Plan of Treatment

Future Appointment(s):2020  4:20 pm - Boston Theodore MD at [facility]10/ - Boston Theodore MDE11.65 Type 2 diabetes 
mellitus with hyperglycemiaFollow up:3 monthsInstructions:1. Your glucose goals 
are as follows: - less than 130mg/dL in the morning - less than 160 mg/dL in 
the evening 2. Increase insulin doses as follows: - 50 units in the morning - 
55 units in evening plusextra 5 units if you have a snack - reduce the above 
doses by 50% if you do not eat a meal 3. Continue other medications as 
prescribed. 4. Reduce calcium/magnesium/zinc to 1-2 tablets at jtomfrkC14.4 
Long term (current) use of tgztiteF13.52 Hypercalcemia



Functional Status







 Description

 

 No Information Available







Mental Status







 Description

 

 No Information Available







Referrals







 Description

 

 No Information Available

## 2019-12-04 VITALS — DIASTOLIC BLOOD PRESSURE: 63 MMHG | SYSTOLIC BLOOD PRESSURE: 124 MMHG

## 2019-12-04 LAB
CHOLEST SERPL-MCNC: 98 MG/DL
HDLC SERPL-MCNC: 30.1 MG/DL
TRIGL SERPL-MCNC: 120 MG/DL
TROPONIN I SERPL-MCNC: 0 NG/ML (ref ?–0.03)

## 2019-12-04 RX ADMIN — METFORMIN HYDROCHLORIDE SCH MG: 500 TABLET, FILM COATED ORAL at 10:12

## 2019-12-04 RX ADMIN — METOPROLOL TARTRATE SCH MG: 25 TABLET, FILM COATED ORAL at 10:12

## 2019-12-04 RX ADMIN — HEPARIN SODIUM SCH UNITS: 5000 INJECTION INTRAVENOUS; SUBCUTANEOUS at 05:37

## 2019-12-04 RX ADMIN — HEPARIN SODIUM SCH UNITS: 5000 INJECTION INTRAVENOUS; SUBCUTANEOUS at 13:58

## 2019-12-05 NOTE — DS
CC:  Dr. Mack *

 

DISCHARGE SUMMARY:

 

DATE OF ADMISSION:  12/03/19

 

DATE OF DISCHARGE:  12/04/19

 

PRIMARY CARE PROVIDER:  Dr. Mack.

 

DISPOSITION ON DISCHARGE:  Home.

 

CONDITION ON DISCHARGE:  Good.

 

PRIMARY DIAGNOSIS:  Noncardiac chest pain.

 

SECONDARY DIAGNOSES:

1.  Insulin-dependent diabetes mellitus.

2.  History of coronary artery disease.

3.  Hyperlipidemia.

 

PERTINENT LABORATORY DATA: Hemoglobin A1c 8.4%, troponin I 0.00 on 5 
consecutive checks, total cholesterol 98, LDL 44, HDL 30.

 

MEDICATIONS ON DISCHARGE:  Unchanged from admission:

1.  Metformin 1500 mg at bedtime.

2.  Flexeril 10 mg 2 to 3 times daily as needed.

3.  Atorvastatin 80 mg daily.

4.  Cinnamon bark 1000 mg daily.

5.  Aspirin 81 mg daily.

6.  Clopidogrel 75 mg daily.

7.  Metoprolol tartrate 25 mg twice daily.

8.  Nitroglycerin sublingual as needed.

9.  Vitamin C 500 mg in the evening.

10.  [place] ginseng 350 mg daily.

11.  Ascorbic acid 1000 mg in the morning.

12.  Grape seed capsule 100 mg twice daily.

13.  Turmeric 400 mg daily.

14.  Co-enzyme Q10 1 cap daily.

15.  Jardiance 10 mg in the morning.

16.  Actos 15 mg daily.

17.  Novolin 70/30, 50 units in the morning and 60 units at night.

18.  Calcium, magnesium, zinc tab 2 in the evening and 1 at night.

 

PERTINENT PROCEDURES PERFORMED DURING HOSPITAL STAY:  Chemical stress with 
nuclear imaging.  Impression:  Fixed apical photopenia, which may reflect 
previous infarct versus physiological apical thinning, no reversible defects 
suggest ischemia low risk.

 

HISTORY OF PRESENT ILLNESS AND HOSPITAL COURSE:  A 58-year-old man with past 
medical history as outlined in the history of present illness on the day of 
admission including CAD with stenting.  Did review his previous records.  He 
did receive stents in the setting of 40% to 50% stenosis.  His records should 
be scanned in the chart at this time.  Did develop racing heart with some chest 
discomfort in the setting of stress at work, sent into the hospital, had normal 
troponins, normal EKG, and then a low risk stress test.  The chest pain was 
thought to be noncardiac, however, stable angina in the setting of recurrent 
stressors at work are possibly in the differential.  His medications were 
unchanged.  He did not have additional chest pain throughout the course of 
hospital stay.  He ambulated without chest pain.  He had no chest pain during 
the course of the stress test. While difficult to accommodate, we did recommend 
attempting lower stress environment, it seems to be the etiology of his chest 
discomfort at this time frequently dominated the conversation between the 2 of 
us.  His hemoglobin A1c is still uncontrolled; however, I understand he has 
been achieving better control in conjunction with Endocrinology over the 
preceding months.  There are no complications throughout the course of hospital 
stay.

 

FOLLOWUP INSTRUCTIONS:  At followup, please:

1.  Follow improve in chest discomfort or any return, evaluate other non-life- 
threatening etiologies as appropriate.

2.  No specific labs or vitals that need followup.

 

REASONS TO RETURN TO THE HOSPITAL:  Including, but not limited to recurrent or 
worsening chest pain with or without associated shortness of breath, nausea, 
vomiting, lightheadedness, loss of consciousness, or near loss of consciousness
, bleeding from any source, inability to obtain or tolerate medications were 
discussed with the patient.  He acknowledged understanding.

 

TIME SPENT:  Greater than 60 minutes was were spent on this discharge of this 
patient, greater than half was spent face-to-face with the patient.

 

 [id number]/CPS #: [id number]

MAGDALENE